# Patient Record
Sex: MALE | Race: WHITE | URBAN - NONMETROPOLITAN AREA
[De-identification: names, ages, dates, MRNs, and addresses within clinical notes are randomized per-mention and may not be internally consistent; named-entity substitution may affect disease eponyms.]

---

## 2017-01-08 ENCOUNTER — HISTORY (OUTPATIENT)
Dept: EMERGENCY MEDICINE | Facility: OTHER | Age: 62
End: 2017-01-08

## 2017-01-09 ENCOUNTER — HISTORY (OUTPATIENT)
Dept: INTENSIVE CARE | Facility: OTHER | Age: 62
End: 2017-01-09

## 2017-01-10 ENCOUNTER — SURGERY (OUTPATIENT)
Dept: SURGERY | Facility: OTHER | Age: 62
End: 2017-01-10

## 2017-01-10 ENCOUNTER — AMBULATORY - GICH (OUTPATIENT)
Dept: LAB | Facility: OTHER | Age: 62
End: 2017-01-10

## 2017-01-12 DIAGNOSIS — S81.809A NON-HEALING WOUND OF LOWER EXTREMITY: ICD-10-CM

## 2017-01-12 DIAGNOSIS — I99.8 ISCHEMIA OF FOOT: Primary | ICD-10-CM

## 2017-01-12 DIAGNOSIS — I79.8 OTHER DISORDERS OF ARTERIES, ARTERIOLES AND CAPILLARIES IN DISEASES CLASSIFIED ELSEWHERE (H): ICD-10-CM

## 2017-01-16 ENCOUNTER — COMMUNICATION - GICH (OUTPATIENT)
Dept: SURGERY | Facility: OTHER | Age: 62
End: 2017-01-16

## 2017-01-16 ENCOUNTER — TRANSFERRED RECORDS (OUTPATIENT)
Dept: HEALTH INFORMATION MANAGEMENT | Facility: HOSPITAL | Age: 62
End: 2017-01-16

## 2017-01-16 LAB
CREAT SERPL-MCNC: 1.43 MG/DL (ref 0.7–1.3)
GFR SERPL CREATININE-BSD FRML MDRD: 50 ML/MIN/1.73M2
GLUCOSE SERPL-MCNC: 86 MG/DL (ref 70–105)
POTASSIUM SERPL-SCNC: 4.5 MMOL/L (ref 3.5–5.1)

## 2017-01-24 ENCOUNTER — OFFICE VISIT - GICH (OUTPATIENT)
Dept: INTERNAL MEDICINE | Facility: OTHER | Age: 62
End: 2017-01-24

## 2017-01-24 ENCOUNTER — COMMUNICATION - GICH (OUTPATIENT)
Dept: SURGERY | Facility: OTHER | Age: 62
End: 2017-01-24

## 2017-01-24 ENCOUNTER — HISTORY (OUTPATIENT)
Dept: INTERNAL MEDICINE | Facility: OTHER | Age: 62
End: 2017-01-24

## 2017-01-24 ENCOUNTER — AMBULATORY - GICH (OUTPATIENT)
Dept: SCHEDULING | Facility: OTHER | Age: 62
End: 2017-01-24

## 2017-01-24 DIAGNOSIS — R93.89 ABNORMAL FINDINGS ON DIAGNOSTIC IMAGING OF OTHER SPECIFIED BODY STRUCTURES: ICD-10-CM

## 2017-01-24 DIAGNOSIS — N17.9 ACUTE KIDNEY FAILURE (H): ICD-10-CM

## 2017-01-24 DIAGNOSIS — A41.9 SEPSIS (H): ICD-10-CM

## 2017-01-24 DIAGNOSIS — L03.115 CELLULITIS OF RIGHT LOWER EXTREMITY: ICD-10-CM

## 2017-01-24 DIAGNOSIS — N39.0 URINARY TRACT INFECTION: ICD-10-CM

## 2017-01-24 DIAGNOSIS — D64.9 ANEMIA: ICD-10-CM

## 2017-01-24 LAB
ABSOLUTE BASOPHILS - HISTORICAL: 0.1 THOU/CU MM
ABSOLUTE EOSINOPHILS - HISTORICAL: 0.2 THOU/CU MM
ABSOLUTE LYMPHOCYTES - HISTORICAL: 1.3 THOU/CU MM (ref 0.9–2.9)
ABSOLUTE MONOCYTES - HISTORICAL: 0.5 THOU/CU MM
ABSOLUTE NEUTROPHILS - HISTORICAL: 3.9 THOU/CU MM (ref 1.7–7)
ANION GAP - HISTORICAL: 10 (ref 5–18)
BASOPHILS # BLD AUTO: 2.3 %
BUN SERPL-MCNC: 22 MG/DL (ref 7–25)
BUN/CREAT RATIO - HISTORICAL: 18
CALCIUM SERPL-MCNC: 9.3 MG/DL (ref 8.6–10.3)
CHLORIDE SERPLBLD-SCNC: 101 MMOL/L (ref 98–107)
CO2 SERPL-SCNC: 27 MMOL/L (ref 21–31)
CREAT SERPL-MCNC: 1.19 MG/DL (ref 0.7–1.3)
EOSINOPHIL NFR BLD AUTO: 2.7 %
ERYTHROCYTE [DISTWIDTH] IN BLOOD BY AUTOMATED COUNT: 19.7 % (ref 11.5–15.5)
GFR IF NOT AFRICAN AMERICAN - HISTORICAL: >60 ML/MIN/1.73M2
GLUCOSE SERPL-MCNC: 95 MG/DL (ref 70–105)
HCT VFR BLD AUTO: 38.3 % (ref 37–53)
HEMOGLOBIN: 11.4 G/DL (ref 13.5–17.5)
LYMPHOCYTES NFR BLD AUTO: 20.9 % (ref 20–44)
MCH RBC QN AUTO: 22.2 PG (ref 26–34)
MCHC RBC AUTO-ENTMCNC: 29.8 G/DL (ref 32–36)
MCV RBC AUTO: 75 FL (ref 80–100)
MONOCYTES NFR BLD AUTO: 9 %
NEUTROPHILS NFR BLD AUTO: 65 % (ref 42–72)
PLATELET # BLD AUTO: 399 THOU/CU MM (ref 140–440)
PMV BLD: 8.8 FL (ref 6.5–11)
POTASSIUM SERPL-SCNC: 4.8 MMOL/L (ref 3.5–5.1)
RED BLOOD COUNT - HISTORICAL: 5.13 MIL/CU MM (ref 4.3–5.9)
SODIUM SERPL-SCNC: 138 MMOL/L (ref 133–143)
WHITE BLOOD COUNT - HISTORICAL: 6 THOU/CU MM (ref 4.5–11)

## 2017-01-26 ENCOUNTER — OFFICE VISIT - GICH (OUTPATIENT)
Dept: SURGERY | Facility: OTHER | Age: 62
End: 2017-01-26

## 2017-01-26 DIAGNOSIS — L97.919 VARICOSE VEINS OF RIGHT LOWER EXTREMITY WITH ULCER (H): ICD-10-CM

## 2017-01-26 DIAGNOSIS — I83.019 VARICOSE VEINS OF RIGHT LOWER EXTREMITY WITH ULCER (H): ICD-10-CM

## 2017-01-26 DIAGNOSIS — I83.029 VARICOSE VEINS OF LEFT LOWER EXTREMITY WITH ULCER (H): ICD-10-CM

## 2017-01-26 DIAGNOSIS — L97.929 VARICOSE VEINS OF LEFT LOWER EXTREMITY WITH ULCER (H): ICD-10-CM

## 2017-01-31 ENCOUNTER — OFFICE VISIT - GICH (OUTPATIENT)
Dept: FAMILY MEDICINE | Facility: OTHER | Age: 62
End: 2017-01-31

## 2017-01-31 ENCOUNTER — HISTORY (OUTPATIENT)
Dept: FAMILY MEDICINE | Facility: OTHER | Age: 62
End: 2017-01-31

## 2017-01-31 DIAGNOSIS — R93.89 ABNORMAL FINDINGS ON DIAGNOSTIC IMAGING OF OTHER SPECIFIED BODY STRUCTURES: ICD-10-CM

## 2017-01-31 DIAGNOSIS — L97.929 VARICOSE VEINS OF LEFT LOWER EXTREMITY WITH ULCER (H): ICD-10-CM

## 2017-01-31 DIAGNOSIS — I83.019 VARICOSE VEINS OF RIGHT LOWER EXTREMITY WITH ULCER (H): ICD-10-CM

## 2017-01-31 DIAGNOSIS — I83.029 VARICOSE VEINS OF LEFT LOWER EXTREMITY WITH ULCER (H): ICD-10-CM

## 2017-01-31 DIAGNOSIS — L97.919 VARICOSE VEINS OF RIGHT LOWER EXTREMITY WITH ULCER (H): ICD-10-CM

## 2017-02-09 ENCOUNTER — HISTORY (OUTPATIENT)
Dept: FAMILY MEDICINE | Facility: OTHER | Age: 62
End: 2017-02-09

## 2017-02-09 ENCOUNTER — HISTORY (OUTPATIENT)
Dept: SURGERY | Facility: OTHER | Age: 62
End: 2017-02-09

## 2017-02-09 ENCOUNTER — OFFICE VISIT - GICH (OUTPATIENT)
Dept: FAMILY MEDICINE | Facility: OTHER | Age: 62
End: 2017-02-09

## 2017-02-09 ENCOUNTER — OFFICE VISIT - GICH (OUTPATIENT)
Dept: SURGERY | Facility: OTHER | Age: 62
End: 2017-02-09

## 2017-02-09 DIAGNOSIS — L97.919 VARICOSE VEINS OF RIGHT LOWER EXTREMITY WITH ULCER (H): ICD-10-CM

## 2017-02-09 DIAGNOSIS — Z87.828 PERSONAL HISTORY OF OTHER (HEALED) PHYSICAL INJURY AND TRAUMA: ICD-10-CM

## 2017-02-09 DIAGNOSIS — N18.9 CHRONIC KIDNEY DISEASE: ICD-10-CM

## 2017-02-09 DIAGNOSIS — L97.929 VARICOSE VEINS OF LEFT LOWER EXTREMITY WITH ULCER (H): ICD-10-CM

## 2017-02-09 DIAGNOSIS — I83.029 VARICOSE VEINS OF LEFT LOWER EXTREMITY WITH ULCER (H): ICD-10-CM

## 2017-02-09 DIAGNOSIS — D64.9 ANEMIA: ICD-10-CM

## 2017-02-09 DIAGNOSIS — I83.019 VARICOSE VEINS OF RIGHT LOWER EXTREMITY WITH ULCER (H): ICD-10-CM

## 2017-02-09 DIAGNOSIS — N17.9 ACUTE KIDNEY FAILURE (H): ICD-10-CM

## 2017-02-09 DIAGNOSIS — R93.89 ABNORMAL FINDINGS ON DIAGNOSTIC IMAGING OF OTHER SPECIFIED BODY STRUCTURES: ICD-10-CM

## 2017-02-09 DIAGNOSIS — I87.8 OTHER SPECIFIED DISORDERS OF VEINS: ICD-10-CM

## 2017-02-09 LAB
A/G RATIO - HISTORICAL: 1.1 (ref 1–2)
ABSOLUTE BASOPHILS - HISTORICAL: 0.1 THOU/CU MM
ABSOLUTE EOSINOPHILS - HISTORICAL: 0.3 THOU/CU MM
ABSOLUTE LYMPHOCYTES - HISTORICAL: 1.4 THOU/CU MM (ref 0.9–2.9)
ABSOLUTE MONOCYTES - HISTORICAL: 0.7 THOU/CU MM
ABSOLUTE NEUTROPHILS - HISTORICAL: 5 THOU/CU MM (ref 1.7–7)
ALBUMIN SERPL-MCNC: 3.8 G/DL (ref 3.5–5.7)
ALP SERPL-CCNC: 70 IU/L (ref 34–104)
ALT (SGPT) - HISTORICAL: 12 IU/L (ref 7–52)
ANION GAP - HISTORICAL: 10 (ref 5–18)
AST SERPL-CCNC: 13 IU/L (ref 13–39)
BASOPHILS # BLD AUTO: 1.4 %
BILIRUB SERPL-MCNC: 0.6 MG/DL (ref 0.3–1)
BUN SERPL-MCNC: 30 MG/DL (ref 7–25)
BUN/CREAT RATIO - HISTORICAL: 29
CALCIUM SERPL-MCNC: 9.8 MG/DL (ref 8.6–10.3)
CHLORIDE SERPLBLD-SCNC: 106 MMOL/L (ref 98–107)
CO2 SERPL-SCNC: 22 MMOL/L (ref 21–31)
CREAT SERPL-MCNC: 1.03 MG/DL (ref 0.7–1.3)
EOSINOPHIL NFR BLD AUTO: 3.5 %
ERYTHROCYTE [DISTWIDTH] IN BLOOD BY AUTOMATED COUNT: 19.9 % (ref 11.5–15.5)
GFR IF NOT AFRICAN AMERICAN - HISTORICAL: >60 ML/MIN/1.73M2
GLOBULIN - HISTORICAL: 3.6 G/DL (ref 2–3.7)
GLUCOSE SERPL-MCNC: 95 MG/DL (ref 70–105)
HCT VFR BLD AUTO: 35.3 % (ref 37–53)
HEMOGLOBIN: 11.3 G/DL (ref 13.5–17.5)
INR - HISTORICAL: 1.1
LYMPHOCYTES NFR BLD AUTO: 19 % (ref 20–44)
MCH RBC QN AUTO: 24.3 PG (ref 26–34)
MCHC RBC AUTO-ENTMCNC: 32 G/DL (ref 32–36)
MCV RBC AUTO: 76 FL (ref 80–100)
MONOCYTES NFR BLD AUTO: 8.8 %
NEUTROPHILS NFR BLD AUTO: 67.3 % (ref 42–72)
PLATELET # BLD AUTO: 251 THOU/CU MM (ref 140–440)
PMV BLD: 10.1 FL (ref 6.5–11)
POTASSIUM SERPL-SCNC: 3.9 MMOL/L (ref 3.5–5.1)
PROT SERPL-MCNC: 7.4 G/DL (ref 6.4–8.9)
PROTIME - HISTORICAL: 12 SEC (ref 11.9–15.2)
RED BLOOD COUNT - HISTORICAL: 4.64 MIL/CU MM (ref 4.3–5.9)
SODIUM SERPL-SCNC: 138 MMOL/L (ref 133–143)
WHITE BLOOD COUNT - HISTORICAL: 7.4 THOU/CU MM (ref 4.5–11)

## 2017-03-10 ENCOUNTER — OFFICE VISIT - GICH (OUTPATIENT)
Dept: SURGERY | Facility: OTHER | Age: 62
End: 2017-03-10

## 2017-03-10 DIAGNOSIS — I83.019 VARICOSE VEINS OF RIGHT LOWER EXTREMITY WITH ULCER (H): ICD-10-CM

## 2017-03-10 DIAGNOSIS — I83.029 VARICOSE VEINS OF LEFT LOWER EXTREMITY WITH ULCER (H): ICD-10-CM

## 2017-03-10 DIAGNOSIS — L97.919 VARICOSE VEINS OF RIGHT LOWER EXTREMITY WITH ULCER (H): ICD-10-CM

## 2017-03-10 DIAGNOSIS — L97.929 VARICOSE VEINS OF LEFT LOWER EXTREMITY WITH ULCER (H): ICD-10-CM

## 2017-03-31 ENCOUNTER — OFFICE VISIT - GICH (OUTPATIENT)
Dept: SURGERY | Facility: OTHER | Age: 62
End: 2017-03-31

## 2017-03-31 ENCOUNTER — HISTORY (OUTPATIENT)
Dept: SURGERY | Facility: OTHER | Age: 62
End: 2017-03-31

## 2017-03-31 DIAGNOSIS — L97.929 VARICOSE VEINS OF LEFT LOWER EXTREMITY WITH ULCER (H): ICD-10-CM

## 2017-03-31 DIAGNOSIS — L97.919 VARICOSE VEINS OF RIGHT LOWER EXTREMITY WITH ULCER (H): ICD-10-CM

## 2017-03-31 DIAGNOSIS — I83.019 VARICOSE VEINS OF RIGHT LOWER EXTREMITY WITH ULCER (H): ICD-10-CM

## 2017-03-31 DIAGNOSIS — I83.029 VARICOSE VEINS OF LEFT LOWER EXTREMITY WITH ULCER (H): ICD-10-CM
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
## 2018-01-03 NOTE — PATIENT INSTRUCTIONS
Patient Information     Patient Name MRN Sex Mando William [id number] Male 1955      Patient Instructions by Mialna Ray DO at 2017  9:20 AM     Author:  Milana Ray DO Service:  (none) Author Type:  PHYS- Osteopathic     Filed:  2017 10:05 AM Encounter Date:  2017 Status:  Signed     :  Milana Ray DO (PHYS- Osteopathic)            Continue current routine of BID dressing changes w/ dakins  Compression  Elevate  Monitor toes for ischemia

## 2018-01-03 NOTE — TELEPHONE ENCOUNTER
Patient Information     Patient Name MRN Sex Mando William [id number] Male 1955      Telephone Encounter by Sandra Calzada at 2017 10:01 AM     Author:  Sandra Calzada Service:  (none) Author Type:  (none)     Filed:  2017 10:02 AM Encounter Date:  2017 Status:  Signed     :  Sandra Calzada Dr said to cancel [facility].  [facility] notified.  Sandra Calzada LPN ....................  2017   10:01 AM

## 2018-01-03 NOTE — TELEPHONE ENCOUNTER
Patient Information     Patient Name MRN Mando Walker [id number] Male 1955      Telephone Encounter by Sandra Calzada at 2017  3:17 PM     Author:  Sandra Calzada Service:  (none) Author Type:  (none)     Filed:  2017  3:18 PM Encounter Date:  2017 Status:  Signed     :  Sandra Calzada            Appointment made and [place] notified.  Sandra Calzada LPN ....................  2017   3:17 PM
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
## 2018-01-03 NOTE — NURSING NOTE
Patient Information     Patient Name MRN Sex Mando William [id number] Male 1955      Nursing Note by Sandra Calzada at 2017  9:20 AM     Author:  Sandra Calzada Service:  (none) Author Type:  (none)     Filed:  2017  9:24 AM Encounter Date:  2017 Status:  Signed     :  Sandra Calzada            Patient comes in for dressing change on both lower leg.  Sandra Calzada LPN ....................  2017   9:23 AM

## 2018-01-03 NOTE — NURSING NOTE
Patient Information     Patient Name MRN Sex Mando William [id number] Male 1955      Nursing Note by Sandra Calzada at 2017  9:20 AM     Author:  Sandra Calzada Service:  (none) Author Type:  (none)     Filed:  2017  9:42 AM Encounter Date:  2017 Status:  Signed     :  Sandra Calzada            Patient comes in for follow up on feet.  Sandra Calzada LPN ....................  2017   9:42 AM

## 2018-01-03 NOTE — PATIENT INSTRUCTIONS
Patient Information     Patient Name MRN Sex Mando William [id number] Male 1955      Patient Instructions by Milana Ray DO at 2017  9:20 AM     Author:  Milana Ray DO Service:  (none) Author Type:  PHYS- Osteopathic     Filed:  2017 10:23 AM Encounter Date:  2017 Status:  Signed     :  Milana Ray DO (PHYS- Osteopathic)            Wet to dry dressing changes w/ dakins solution      Try to go just once a day changes    If too much drainage- than go back to bid
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
## 2018-01-03 NOTE — NURSING NOTE
Patient Information     Patient Name MRN Mando Walker [id number] Male 1955      Nursing Note by Yina Tellez at 2017 10:15 AM     Author:  Yina Tellez Service:  (none) Author Type:  (none)     Filed:  2017 11:36 AM Encounter Date:  2017 Status:  Signed     :  Yina Tellez            Coming in for a F/U on his legs  Yina Tellez ....................  2017   11:15 AM

## 2018-01-03 NOTE — TELEPHONE ENCOUNTER
Patient Information     Patient Name MRN Sex Mando William [id number] Male 1955      Telephone Encounter by Sandra Calzada at 2017 11:43 AM     Author:  Sandra Calzada Service:  (none) Author Type:  (none)     Filed:  2017 11:51 AM Encounter Date:  2017 Status:  Signed     :  Sandra Calzada            [place] [facility] called and said this patient was to have a MRA tomorrow at 9:30 in [place] if her Lab(GFR??) came up.  Did it comes up?  Should they plan on doing it tomorrow AM?  Sandra Calzada LPN ....................  2017   11:50 AM

## 2018-01-04 NOTE — NURSING NOTE
Patient Information     Patient Name MRN Mando Walker [id number] Male 1955      Nursing Note by Sandra Calzada at 3/31/2017  2:50 PM     Author:  Sandra Calzada Service:  (none) Author Type:  (none)     Filed:  3/31/2017  3:12 PM Encounter Date:  3/31/2017 Status:  Signed     :  Sandra Calzada            Patient comes in for bilateral leg ulcers.  Sandra Calzada LPN ....................  3/31/2017   3:09 PM
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
## 2018-01-27 VITALS
SYSTOLIC BLOOD PRESSURE: 130 MMHG | TEMPERATURE: 98.2 F | DIASTOLIC BLOOD PRESSURE: 80 MMHG | WEIGHT: 295 LBS | SYSTOLIC BLOOD PRESSURE: 128 MMHG | BODY MASS INDEX: 37.99 KG/M2 | WEIGHT: 296 LBS | HEIGHT: 74 IN | DIASTOLIC BLOOD PRESSURE: 80 MMHG | TEMPERATURE: 97.3 F

## 2018-01-27 VITALS
DIASTOLIC BLOOD PRESSURE: 100 MMHG | WEIGHT: 302 LBS | WEIGHT: 290.6 LBS | RESPIRATION RATE: 16 BRPM | SYSTOLIC BLOOD PRESSURE: 132 MMHG | SYSTOLIC BLOOD PRESSURE: 170 MMHG | TEMPERATURE: 97.6 F | DIASTOLIC BLOOD PRESSURE: 76 MMHG | BODY MASS INDEX: 38.77 KG/M2 | BODY MASS INDEX: 37.31 KG/M2

## 2018-01-27 VITALS
SYSTOLIC BLOOD PRESSURE: 188 MMHG | TEMPERATURE: 97.1 F | BODY MASS INDEX: 38.52 KG/M2 | WEIGHT: 300 LBS | DIASTOLIC BLOOD PRESSURE: 90 MMHG

## 2018-01-27 VITALS
DIASTOLIC BLOOD PRESSURE: 84 MMHG | WEIGHT: 297 LBS | TEMPERATURE: 96.1 F | SYSTOLIC BLOOD PRESSURE: 122 MMHG | BODY MASS INDEX: 38.13 KG/M2

## 2018-01-27 VITALS
WEIGHT: 297 LBS | SYSTOLIC BLOOD PRESSURE: 122 MMHG | DIASTOLIC BLOOD PRESSURE: 84 MMHG | BODY MASS INDEX: 38.13 KG/M2 | RESPIRATION RATE: 16 BRPM

## 2018-02-09 ENCOUNTER — DOCUMENTATION ONLY (OUTPATIENT)
Dept: FAMILY MEDICINE | Facility: OTHER | Age: 63
End: 2018-02-09

## 2018-02-09 PROBLEM — R93.89 ABNORMAL CHEST X-RAY: Status: ACTIVE | Noted: 2017-01-09

## 2018-02-09 PROBLEM — I83.019 VENOUS STASIS ULCERS OF BOTH LOWER EXTREMITIES (H): Status: ACTIVE | Noted: 2017-01-09

## 2018-02-09 PROBLEM — I83.029 VENOUS STASIS ULCERS OF BOTH LOWER EXTREMITIES (H): Status: ACTIVE | Noted: 2017-01-09

## 2018-02-09 PROBLEM — L97.919 VENOUS STASIS ULCERS OF BOTH LOWER EXTREMITIES (H): Status: ACTIVE | Noted: 2017-01-09

## 2018-02-09 PROBLEM — N17.9 ARF (ACUTE RENAL FAILURE) (H): Status: ACTIVE | Noted: 2017-01-08

## 2018-02-09 PROBLEM — S31.119A STAB WOUND OF ABDOMEN: Status: ACTIVE | Noted: 2017-01-09

## 2018-02-09 PROBLEM — L97.929 VENOUS STASIS ULCERS OF BOTH LOWER EXTREMITIES (H): Status: ACTIVE | Noted: 2017-01-09

## 2018-02-09 PROBLEM — I89.0 LYMPHEDEMA OF BOTH LOWER EXTREMITIES: Status: ACTIVE | Noted: 2017-01-11

## 2018-02-09 PROBLEM — A41.9 SEPSIS (H): Status: ACTIVE | Noted: 2017-01-08

## 2018-02-09 PROBLEM — Z72.0 TOBACCO CHEW USE: Status: ACTIVE | Noted: 2018-02-09

## 2018-02-09 PROBLEM — D64.9 ANEMIA: Status: ACTIVE | Noted: 2017-01-16

## 2018-02-09 PROBLEM — R33.9 RETENTION OF URINE: Status: ACTIVE | Noted: 2017-01-08

## 2018-02-09 PROBLEM — L03.90 CELLULITIS: Status: ACTIVE | Noted: 2017-01-08

## 2018-02-09 RX ORDER — SODIUM HYPOCHLORITE 2.5 MG/ML
SOLUTION TOPICAL 2 TIMES DAILY
COMMUNITY
Start: 2017-02-10

## 2018-02-09 RX ORDER — AMOXICILLIN 250 MG
1 CAPSULE ORAL 2 TIMES DAILY
COMMUNITY
Start: 2017-02-09

## 2018-02-09 RX ORDER — ACETAMINOPHEN 500 MG
500 TABLET ORAL EVERY 4 HOURS PRN
COMMUNITY
Start: 2017-01-17

## 2018-02-09 RX ORDER — NON-ADHERENT BANDAGE 3"X8"
5 BANDAGE TOPICAL DAILY
COMMUNITY
Start: 2017-02-09

## 2018-02-09 RX ORDER — GAUZE BANDAGE 4" X 4"
BANDAGE TOPICAL
COMMUNITY
Start: 2017-01-31

## 2018-02-09 RX ORDER — OXYCODONE AND ACETAMINOPHEN 5; 325 MG/1; MG/1
1 TABLET ORAL EVERY 6 HOURS PRN
COMMUNITY
Start: 2017-02-09

## 2018-02-09 RX ORDER — SODIUM HYPOCHLORITE 2.5 MG/ML
SOLUTION TOPICAL 2 TIMES DAILY
COMMUNITY
Start: 2017-02-09

## 2018-07-23 NOTE — PROGRESS NOTES
Patient Information     Patient Name  Mando Cooper MRN  [id number] Sex  Male   1955      Letter by Fatuma Luna NP at      Author:  Fatuma Luna NP Service:  (none) Author Type:  (none)    Filed:   Encounter Date:  2017 Status:  (Other)           Mando Cooper  [id number] [facility] [address]          2017    Dear Mr. Cooper:    Following are the tests completed during your last clinic visit.  The results of these tests show that your kidney function is back to normal. Your hemoglobin is better at 11.4 g/dL. Normal is 12 g/dL. I recommend that you continue with the iron sulfate 1 tablet twice daily for the next 2 months and then repeat lab. If you decide that you would like to work up the anemia further by having an EGD or colonoscopy or if you would like to proceed with getting a chest CT to evaluate the abnormalities found on chest x-ray during your hospitalization, please let me know and we can get these scheduled.    Results for orders placed or performed in visit on 17      BASIC METABOLIC PANEL      Result  Value Ref Range    SODIUM 138 133 - 143 mmol/L    POTASSIUM 4.8 3.5 - 5.1 mmol/L    CHLORIDE 101 98 - 107 mmol/L    CO2,TOTAL 27 21 - 31 mmol/L    ANION GAP 10 5 - 18                    GLUCOSE 95 70 - 105 mg/dL    CALCIUM 9.3 8.6 - 10.3 mg/dL    BUN 22 7 - 25 mg/dL    CREATININE 1.19 0.70 - 1.30 mg/dL    BUN/CREAT RATIO           18                    GFR if African American >60 >60 ml/min/1.73m2    GFR if not African American >60 >60 ml/min/1.73m2   CBC WITH AUTO DIFFERENTIAL      Result  Value Ref Range    WHITE BLOOD COUNT         6.0 4.5 - 11.0 thou/cu mm    RED BLOOD COUNT           5.13 4.30 - 5.90 mil/cu mm    HEMOGLOBIN                11.4 (L) 13.5 - 17.5 g/dL    HEMATOCRIT                38.3 37.0 - 53.0 %    MCV                       75 (L) 80 - 100 fL    MCH                       22.2 (L) 26.0 - 34.0 pg    MCHC                       29.8 (L) 32.0 - 36.0 g/dL    RDW                       19.7 (H) 11.5 - 15.5 %    PLATELET COUNT            399 140 - 440 thou/cu mm    MPV                       8.8 6.5 - 11.0 fL    NEUTROPHILS               65.0 42.0 - 72.0 %    LYMPHOCYTES               20.9 20.0 - 44.0 %    MONOCYTES                 9.0 <12.0 %    EOSINOPHILS               2.7 <8.0 %    BASOPHILS                 2.3 <3.0 %    ABSOLUTE NEUTROPHILS      3.9 1.7 - 7.0 thou/cu mm    ABSOLUTE LYMPHOCYTES      1.3 0.9 - 2.9 thou/cu mm    ABSOLUTE MONOCYTES        0.5 <0.9 thou/cu mm    ABSOLUTE EOSINOPHILS      0.2 <0.5 thou/cu mm    ABSOLUTE BASOPHILS        0.1 <0.3 thou/cu mm       If you have any further questions or problems contact my office.    Thank you,    Fatuma Luna NP ....................  1/24/2017   11:17 AM

## 2020-11-14 ENCOUNTER — TRANSFERRED RECORDS (OUTPATIENT)
Dept: INTERNAL MEDICINE | Facility: OTHER | Age: 65
End: 2020-11-14

## 2020-11-23 ENCOUNTER — TRANSFERRED RECORDS (OUTPATIENT)
Dept: HEALTH INFORMATION MANAGEMENT | Facility: OTHER | Age: 65
End: 2020-11-23